# Patient Record
Sex: FEMALE | Race: BLACK OR AFRICAN AMERICAN | NOT HISPANIC OR LATINO | Employment: OTHER | URBAN - METROPOLITAN AREA
[De-identification: names, ages, dates, MRNs, and addresses within clinical notes are randomized per-mention and may not be internally consistent; named-entity substitution may affect disease eponyms.]

---

## 2017-06-20 PROBLEM — G51.39 HEMIFACIAL SPASM: Chronic | Status: ACTIVE | Noted: 2017-06-20

## 2022-07-21 DIAGNOSIS — I99.8 WHITE MATTER DISEASE OF BRAIN DUE TO ISCHEMIA: ICD-10-CM

## 2022-07-21 DIAGNOSIS — R25.9 ABNORMAL INVOLUNTARY MOVEMENT: ICD-10-CM

## 2022-07-21 DIAGNOSIS — R90.82 WHITE MATTER DISEASE OF BRAIN DUE TO ISCHEMIA: ICD-10-CM

## 2022-07-21 DIAGNOSIS — G51.31 HEMIFACIAL SPASM OF RIGHT SIDE OF FACE: Primary | ICD-10-CM

## 2022-07-25 ENCOUNTER — OFFICE VISIT (OUTPATIENT)
Dept: NEUROLOGY | Facility: CLINIC | Age: 62
End: 2022-07-25
Payer: MEDICARE

## 2022-07-25 VITALS
WEIGHT: 187 LBS | HEIGHT: 62 IN | SYSTOLIC BLOOD PRESSURE: 138 MMHG | BODY MASS INDEX: 34.41 KG/M2 | DIASTOLIC BLOOD PRESSURE: 80 MMHG

## 2022-07-25 DIAGNOSIS — R25.9 ABNORMAL INVOLUNTARY MOVEMENT: ICD-10-CM

## 2022-07-25 DIAGNOSIS — G51.31 HEMIFACIAL SPASM OF RIGHT SIDE OF FACE: Primary | ICD-10-CM

## 2022-07-25 DIAGNOSIS — R90.82 WHITE MATTER DISEASE OF BRAIN DUE TO ISCHEMIA: ICD-10-CM

## 2022-07-25 DIAGNOSIS — I99.8 WHITE MATTER DISEASE OF BRAIN DUE TO ISCHEMIA: ICD-10-CM

## 2022-07-25 PROCEDURE — 99214 OFFICE O/P EST MOD 30 MIN: CPT | Mod: PBBFAC | Performed by: SPECIALIST

## 2022-07-25 PROCEDURE — 99202 OFFICE O/P NEW SF 15 MIN: CPT | Mod: S$GLB,,, | Performed by: SPECIALIST

## 2022-07-25 PROCEDURE — 99999 PR PBB SHADOW E&M-EST. PATIENT-LVL IV: ICD-10-PCS | Mod: PBBFAC,,, | Performed by: SPECIALIST

## 2022-07-25 PROCEDURE — 99999 PR PBB SHADOW E&M-EST. PATIENT-LVL IV: CPT | Mod: PBBFAC,,, | Performed by: SPECIALIST

## 2022-07-25 PROCEDURE — 99202 PR OFFICE/OUTPT VISIT, NEW, LEVL II, 15-29 MIN: ICD-10-PCS | Mod: S$GLB,,, | Performed by: SPECIALIST

## 2022-07-25 RX ORDER — GLIMEPIRIDE 4 MG/1
TABLET ORAL
COMMUNITY
Start: 2022-05-23

## 2022-07-25 NOTE — PROGRESS NOTES
"Subjective:       Patient ID: Spring Yeung is a 61 y.o. female.    Chief Complaint: hemifacial spasm     HPI:            (Pt ref by Dr. Lora for neuro cons to eval for hemifacial spasm; states initial onset approx 2 yrs ago;   B facial spasms, but the right side seems to be most active; has been receiving botox from [facility] in [place], but no longer able to travel that distance/../Denies prior diff tolerating botox, and reports that it has been helpful: her last botox injection was sometime in the fall of 2021)    notes may also be on facesheet for HPI, ROS, and other sections     Review of Systems          Social History     Socioeconomic History    Marital status:    Tobacco Use    Smoking status: Never Smoker    Smokeless tobacco: Never Used   Substance and Sexual Activity    Alcohol use: Yes     Comment: occassional    Drug use: Never     ----------------------------  Diabetes mellitus  Hypertension  Other and unspecified hyperlipidemia      Current Outpatient Medications:     amlodipine (NORVASC) 10 MG tablet, Take 10 mg by mouth once daily., Disp: , Rfl:     aspirin (ECOTRIN) 81 MG EC tablet, Take 81 mg by mouth every other day., Disp: , Rfl:     glimepiride (AMARYL) 4 MG tablet, TAKE ONE TABLET BY MOUTH TWICE DAILY BEFORE meal, Disp: , Rfl:     losartan-hydrochlorothiazide 100-12.5 mg (HYZAAR) 100-12.5 mg Tab, Take 1 tablet by mouth once daily., Disp: , Rfl:     metformin (GLUCOPHAGE) 500 MG tablet, Take 500 mg by mouth 2 (two) times daily with meals., Disp: , Rfl:     mv,calcium,min/iron/folic/vitK (ONE-A-DAY WOMEN'S COMPLETE ORAL), Take by mouth., Disp: , Rfl:     pravastatin (PRAVACHOL) 20 MG tablet, Take 20 mg by mouth once daily., Disp: , Rfl:      Objective:        Exam:   /80   Ht 5' 2" (1.575 m)   Wt 84.8 kg (187 lb)   BMI 34.20 kg/m²     General Exam  _._unaccompanied  if accompanied, by__  body habitus_ Body mass index is 34.2 kg/m².    mental status_alert " and appropriate  oropharynx_Mallampati grade_  neck_  heart__  extremities_  skin_    Neurological:  cortical function__  MMSE:   No flowsheet data found.    Speech__ ok   cranial nerves:  CN 2 VF_ok   fundi_   CN 3, 4, 6 EOMs_ok  CN 3, pupils_ok  Abnl blink on R   CN 7_ lower face is asymmetric / intermittent R hf spasms   CN 8_hearing _  CN 12 tongue_ok    Motor__ seems ok   tone:   muscle stretch reflexes__  Vib sens_  Pin sens_  plantars__  tremor: _  coordination: _  gait_  indep    Neuroimaging:  She's had an MRI brain in the past   No reports     Labs:        Assessment/Plan:       Problem List Items Addressed This Visit        Neuro    Hemifacial spasm - right - Primary (Chronic)      Other Visit Diagnoses     Abnormal involuntary movement        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

## 2022-08-30 ENCOUNTER — TELEPHONE (OUTPATIENT)
Dept: NEUROLOGY | Facility: CLINIC | Age: 62
End: 2022-08-30
Payer: MEDICARE

## 2022-08-30 NOTE — TELEPHONE ENCOUNTER
I had contacted Dr. Lora's office to get prior injection notes, so that Medicare auth could be initiated  ..  Received a call back from Kimmie Lora's office: she states to disregard botox, that pt did not want to proceed  ..  Called to discuss w patient: she states that she has been notified that Dr. Lora can do her botox now.  Appt cancelled. She sends her appreciation, will call if needs  in future

## 2024-04-16 ENCOUNTER — HOSPITAL ENCOUNTER (EMERGENCY)
Facility: HOSPITAL | Age: 64
Discharge: HOME | End: 2024-04-16
Attending: EMERGENCY MEDICINE
Payer: MEDICARE

## 2024-04-16 VITALS
TEMPERATURE: 98 F | HEART RATE: 74 BPM | OXYGEN SATURATION: 100 % | WEIGHT: 206 LBS | SYSTOLIC BLOOD PRESSURE: 169 MMHG | DIASTOLIC BLOOD PRESSURE: 92 MMHG | BODY MASS INDEX: 33.11 KG/M2 | HEIGHT: 66 IN | RESPIRATION RATE: 16 BRPM

## 2024-04-16 DIAGNOSIS — S39.012A STRAIN OF LUMBAR PARASPINAL MUSCLE, INITIAL ENCOUNTER: Primary | ICD-10-CM

## 2024-04-16 PROCEDURE — 25000003 PHARM REV CODE 250: Performed by: PHYSICIAN ASSISTANT

## 2024-04-16 PROCEDURE — 96372 THER/PROPH/DIAG INJ SC/IM: CPT | Performed by: PHYSICIAN ASSISTANT

## 2024-04-16 PROCEDURE — 63600175 PHARM REV CODE 636 W HCPCS: Performed by: PHYSICIAN ASSISTANT

## 2024-04-16 PROCEDURE — 99284 EMERGENCY DEPT VISIT MOD MDM: CPT | Mod: 25

## 2024-04-16 RX ORDER — HYDROCODONE BITARTRATE AND ACETAMINOPHEN 5; 325 MG/1; MG/1
1 TABLET ORAL
Status: COMPLETED | OUTPATIENT
Start: 2024-04-16 | End: 2024-04-16

## 2024-04-16 RX ORDER — METHOCARBAMOL 500 MG/1
1000 TABLET, FILM COATED ORAL 3 TIMES DAILY
Qty: 30 TABLET | Refills: 0 | Status: SHIPPED | OUTPATIENT
Start: 2024-04-16 | End: 2024-04-21

## 2024-04-16 RX ORDER — HYDROCODONE BITARTRATE AND ACETAMINOPHEN 5; 325 MG/1; MG/1
1 TABLET ORAL EVERY 8 HOURS PRN
Qty: 9 TABLET | Refills: 0 | Status: SHIPPED | OUTPATIENT
Start: 2024-04-16 | End: 2024-04-19

## 2024-04-16 RX ORDER — ORPHENADRINE CITRATE 30 MG/ML
60 INJECTION INTRAMUSCULAR; INTRAVENOUS
Status: COMPLETED | OUTPATIENT
Start: 2024-04-16 | End: 2024-04-16

## 2024-04-16 RX ADMIN — HYDROCODONE BITARTRATE AND ACETAMINOPHEN 1 TABLET: 5; 325 TABLET ORAL at 04:04

## 2024-04-16 RX ADMIN — ORPHENADRINE CITRATE 60 MG: 60 INJECTION INTRAMUSCULAR; INTRAVENOUS at 05:04

## 2024-04-16 NOTE — ED PROVIDER NOTES
Encounter Date: 4/16/2024       History     Chief Complaint   Patient presents with    Back Pain     63-year-old female with a history of diabetes and hypertension presents to the ED with right lower back pain for the last month.  Patient states his symptoms started after lifting on 1 of her heavier patient's and states she was not lifted since.  Denies taking any medications at home.  Denies bowel or bladder incontinence, saddle paresthesia, numbness, tingling, weakness.    The history is provided by the patient. No  was used.     Review of patient's allergies indicates:   Allergen Reactions    Citrus and derivatives Anaphylaxis    Ibuprofen Anaphylaxis    Nsaids (non-steroidal anti-inflammatory drug)      Past Medical History:   Diagnosis Date    Diabetes mellitus     Hypertension     Other and unspecified hyperlipidemia      No past surgical history on file.  Family History   Family history unknown: Yes     Social History     Tobacco Use    Smoking status: Never    Smokeless tobacco: Never   Substance Use Topics    Alcohol use: Yes     Comment: occassional    Drug use: Never     Review of Systems   Constitutional:  Negative for chills and fever.   Eyes:  Negative for visual disturbance.   Respiratory:  Negative for cough and shortness of breath.    Cardiovascular:  Negative for chest pain.   Gastrointestinal:  Negative for abdominal pain, nausea and vomiting.   Genitourinary:  Negative for dysuria.   Musculoskeletal:  Positive for back pain. Negative for arthralgias.   Skin:  Negative for color change and rash.   Neurological:  Negative for dizziness and headaches.   Psychiatric/Behavioral:  Negative for behavioral problems.    All other systems reviewed and are negative.      Physical Exam     Initial Vitals [04/16/24 1559]   BP Pulse Resp Temp SpO2   (!) 169/92 74 18 98.2 °F (36.8 °C) 100 %      MAP       --         Physical Exam    Nursing note and vitals reviewed.  Constitutional: She  appears well-developed and well-nourished.   HENT:   Head: Normocephalic and atraumatic.   Eyes: EOM are normal. Pupils are equal, round, and reactive to light.   Neck: Neck supple.   Cardiovascular:  Normal rate, regular rhythm and normal heart sounds.           Pulmonary/Chest: Breath sounds normal.   Musculoskeletal:         General: Normal range of motion.      Cervical back: Normal and neck supple.      Thoracic back: Normal.      Lumbar back: Tenderness present. No spasms or bony tenderness. Normal range of motion. Negative right straight leg raise test and negative left straight leg raise test. No scoliosis.        Back:      Neurological: She is alert and oriented to person, place, and time. She has normal strength. GCS score is 15. GCS eye subscore is 4. GCS verbal subscore is 5. GCS motor subscore is 6.   Skin: Skin is warm and dry.   Psychiatric: She has a normal mood and affect.         ED Course   Procedures  Labs Reviewed - No data to display       Imaging Results              X-Ray Lumbar Spine Ap And Lateral (Final result)  Result time 04/16/24 16:31:53      Final result by Marialuisa Alatorre MD (04/16/24 16:31:53)                   Impression:      1. No acute abnormality identified.  2. Multilevel degenerative changes of the lumbar spine.      Electronically signed by: Marialuisa Alatorre  Date:    04/16/2024  Time:    16:31               Narrative:    EXAMINATION:  XR LUMBAR SPINE AP AND LATERAL    CLINICAL HISTORY:  low back pain;    COMPARISON:  None.    FINDINGS:  There are 5 non-rib-bearing lumbar type vertebral bodies.  Alignment is preserved.  The vertebral body heights are maintained.  There is moderate disc height loss at L5-S1 with small marginal osteophytes.  There is mild-to-moderate facet arthropathy in the lower lumbar spine.  There is scattered vascular calcifications.                                       Medications   HYDROcodone-acetaminophen 5-325 mg per tablet 1 tablet (1 tablet  Oral Given 4/16/24 1659)   orphenadrine injection 60 mg (60 mg Intramuscular Given 4/16/24 1700)     Medical Decision Making  Differential diagnosis:  Lumbar strain versus spasm, DJD, DDD    63-year-old female with a history of diabetes and hypertension presents to the ED with right lower back pain for the last month.  Patient states his symptoms started after lifting on 1 of her heavier patient's and states she was not lifted since.  Denies taking any medications at home.  Denies bowel or bladder incontinence, saddle paresthesia, numbness, tingling, weakness.    Amount and/or Complexity of Data Reviewed  Radiology: ordered.    Risk  Prescription drug management.                                      Clinical Impression:  Final diagnoses:  [S39.012A] Strain of lumbar paraspinal muscle, initial encounter (Primary)          ED Disposition Condition    Discharge Stable          ED Prescriptions       Medication Sig Dispense Start Date End Date Auth. Provider    HYDROcodone-acetaminophen (NORCO) 5-325 mg per tablet Take 1 tablet by mouth every 8 (eight) hours as needed for Pain. 9 tablet 4/16/2024 4/19/2024 Dwayne Mackay PA-C    methocarbamoL (ROBAXIN) 500 MG Tab Take 2 tablets (1,000 mg total) by mouth 3 (three) times daily. for 5 days 30 tablet 4/16/2024 4/21/2024 Dwayne Mackay PA-C          Follow-up Information       Follow up With Specialties Details Why Contact Info    Priyanka Howard, [facility]   [address] [place] [id number]  [phone]      [facility] - Emergency Dept Emergency Medicine In 1 week If symptoms worsen [id number]  [facility] [id number] [phone]             Dwayne Mackay PA-C  04/16/24 2097

## 2024-04-24 DIAGNOSIS — M25.561 ACUTE PAIN OF RIGHT KNEE: Primary | ICD-10-CM

## 2024-04-24 DIAGNOSIS — M25.561 RIGHT KNEE PAIN: Primary | ICD-10-CM

## 2024-06-14 DIAGNOSIS — M25.561 RIGHT KNEE PAIN: Primary | ICD-10-CM

## 2024-07-01 ENCOUNTER — OFFICE VISIT (OUTPATIENT)
Dept: ORTHOPEDICS | Facility: CLINIC | Age: 64
End: 2024-07-01
Payer: MEDICARE

## 2024-07-01 ENCOUNTER — HOSPITAL ENCOUNTER (OUTPATIENT)
Dept: RADIOLOGY | Facility: CLINIC | Age: 64
Discharge: HOME | End: 2024-07-01
Attending: REHABILITATION UNIT
Payer: MEDICARE

## 2024-07-01 VITALS
BODY MASS INDEX: 31.92 KG/M2 | WEIGHT: 198.63 LBS | SYSTOLIC BLOOD PRESSURE: 159 MMHG | HEIGHT: 66 IN | DIASTOLIC BLOOD PRESSURE: 101 MMHG | HEART RATE: 99 BPM

## 2024-07-01 DIAGNOSIS — M25.561 RIGHT KNEE PAIN, UNSPECIFIED CHRONICITY: ICD-10-CM

## 2024-07-01 DIAGNOSIS — M25.561 RIGHT KNEE PAIN: ICD-10-CM

## 2024-07-01 DIAGNOSIS — M17.11 ARTHRITIS OF RIGHT KNEE: Primary | ICD-10-CM

## 2024-07-01 PROCEDURE — 73564 X-RAY EXAM KNEE 4 OR MORE: CPT | Mod: RT,,, | Performed by: REHABILITATION UNIT

## 2024-07-01 RX ORDER — BETAMETHASONE SODIUM PHOSPHATE AND BETAMETHASONE ACETATE 3; 3 MG/ML; MG/ML
12 INJECTION, SUSPENSION INTRA-ARTICULAR; INTRALESIONAL; INTRAMUSCULAR; SOFT TISSUE
Status: DISCONTINUED | OUTPATIENT
Start: 2024-07-01 | End: 2024-07-01 | Stop reason: HOSPADM

## 2024-07-01 RX ORDER — MELOXICAM 15 MG/1
15 TABLET ORAL DAILY
Qty: 30 TABLET | Refills: 2 | Status: SHIPPED | OUTPATIENT
Start: 2024-07-01

## 2024-07-01 RX ORDER — LIDOCAINE HYDROCHLORIDE 20 MG/ML
40 INJECTION, SOLUTION INFILTRATION; PERINEURAL
Status: DISCONTINUED | OUTPATIENT
Start: 2024-07-01 | End: 2024-07-01 | Stop reason: HOSPADM

## 2024-07-01 RX ADMIN — BETAMETHASONE SODIUM PHOSPHATE AND BETAMETHASONE ACETATE 12 MG: 3; 3 INJECTION, SUSPENSION INTRA-ARTICULAR; INTRALESIONAL; INTRAMUSCULAR; SOFT TISSUE at 02:07

## 2024-07-01 RX ADMIN — LIDOCAINE HYDROCHLORIDE 40 MG: 20 INJECTION, SOLUTION INFILTRATION; PERINEURAL at 02:07

## 2024-07-01 NOTE — PROGRESS NOTES
Subjective:      Patient ID: Spring Yeung is a 63 y.o. female.    Chief Complaint: Pain of the Right Knee (Ongoing pain for about 4 years and has been getting worse over time. Stated knee feels like it will give out at random times. Stated knee swells at times. Is taking advil which only helps some. )    HPI:   Spring Yeung is a 63 y.o. female who presents today for initial evaluation of her right knee    History of Present Illness  The patient presents for evaluation of right knee pain. She is accompanied by her sister.    The patient has been experiencing persistent right knee pain for several years, which has recently intensified, accompanied by swelling. She has not sought medical attention or sought treatment for these symptoms. She has attempted to manage the pain with Advil, which provides temporary relief, but the pain recurs. She has not utilized any braces, therapy, or injections. Despite the pain, she does not require the use of a cane for mobility. She has ceased jogging and resumed walking due to the increased swelling. She reports crepitus and occasional instability during ambulation.    Past Medical History:   Diagnosis Date    Diabetes mellitus     Hypertension     Other and unspecified hyperlipidemia      History reviewed. No pertinent surgical history.  Social History     Socioeconomic History    Marital status:    Tobacco Use    Smoking status: Never    Smokeless tobacco: Never   Substance and Sexual Activity    Alcohol use: Yes     Comment: occassional    Drug use: Never         Current Outpatient Medications:     amlodipine (NORVASC) 10 MG tablet, Take 10 mg by mouth once daily., Disp: , Rfl:     aspirin (ECOTRIN) 81 MG EC tablet, Take 81 mg by mouth every other day., Disp: , Rfl:     glimepiride (AMARYL) 4 MG tablet, TAKE ONE TABLET BY MOUTH TWICE DAILY BEFORE meal, Disp: , Rfl:     losartan-hydrochlorothiazide 100-12.5 mg (HYZAAR) 100-12.5 mg Tab, Take 1 tablet by mouth  "once daily., Disp: , Rfl:     metformin (GLUCOPHAGE) 500 MG tablet, Take 500 mg by mouth 2 (two) times daily with meals., Disp: , Rfl:     mv,calcium,min/iron/folic/vitK (ONE-A-DAY WOMEN'S COMPLETE ORAL), Take by mouth., Disp: , Rfl:     pravastatin (PRAVACHOL) 20 MG tablet, Take 20 mg by mouth once daily., Disp: , Rfl:   Review of patient's allergies indicates:   Allergen Reactions    Citrus and derivatives Anaphylaxis    Ibuprofen Anaphylaxis    Nsaids (non-steroidal anti-inflammatory drug)        BP (!) 159/101 Comment: stated has been rushing all morning  Pulse 99   Ht 5' 6" (1.676 m)   Wt 90.1 kg (198 lb 9.6 oz)   BMI 32.05 kg/m²     Comprehensive review of systems completed and negative except as per HPI.        Objective:   Head: Normocephalic, without obvious abnormality, atraumatic  Eyes: conjunctivae/corneas clear. EOM's intact  Ears: normal external appearance  Nose: Nares normal. Septum midline. Mucosa normal. No drainage  Throat: normal findings: lips normal without lesions  Lungs: unlabored breathing on room air  Chest wall: symmetric chest rise  Heart: regular rate and rhythm  Pulses: 2+ and symmetric  Skin: Skin color, texture, turgor normal. No rashes or lesions  Neurologic: Grossly normal    right KNEE:     Alignment: neutral  Appearance: skin is intact without lesion  Effusion:  Mild  Gait: antalgic  Straight Leg Raise: negative  Log Roll: negative  Hip ROM: full and painless  Ankle ROM: full and painless   Knee ROM:  0 - 125  Tenderness: TTP medial joint line  Patellar Mobility: normal  Patellar grind: +  PF Crepitus:+     Valgus Stress @ 0 deg: stable  Valgus Stress @ 30 deg: stable  Varus Stress @ 0 deg: stable  Varus Stress @ 30 deg: stable  Lachman: stable  Ant Drawer: negative   Post Drawer: negative  Posterior Sag Sign: negative  Neurological deficits: SILT dp/sp/t distributions  Motor: 5/5 EHL/FHL/TA/GS    Warm well perfused lower extremity with capillary refill less than 2 seconds and " sensation intact to light touch in the terminal nerve distributions. Calf soft and easily compressible without clinical sign of DVT. No palpable popliteal lymphadenopathy    Assessment:     Imaging:   Four view weight bearing radiographs of the right knee obtained today personally reviewed showing no acute fractures or dislocations.  Tricompartmental osteoarthritis most severe in the lateral compartment with near bone-on-bone contact, subchondral sclerosis, and osteophyte formation.      4    Date/Time: 7/1/2024 2:00 PM    Performed by: Sahil Adams MD  Authorized by: Sahil Adams MD    Consent Done?:  Yes (Verbal)  Indications:  Arthritis and pain  Site marked: the procedure site was marked    Timeout: prior to procedure the correct patient, procedure, and site was verified    Prep: patient was prepped and draped in usual sterile fashion    Local anesthesia used?: No      Details:  Needle Size:  22 G  Ultrasonic Guidance for needle placement?: No    Approach:  Anterolateral  Location:  Knee  Site:  R knee  Medications:  40 mg LIDOcaine HCL 20 mg/ml (2%) 20 mg/mL (2 %); 12 mg betamethasone acetate-betamethasone sodium phosphate 6 mg/mL  Patient tolerance:  Patient tolerated the procedure well with no immediate complications        1. Arthritis of right knee    2. Right knee pain          Plan:       Orders Placed This Encounter    X-Ray Knee Complete 4 Or More Views Right        Assessment & Plan  1. Right knee osteoarthritis  Imaging and exam findings discussed.  The patient presents with advanced arthritis in the right knee, accompanied by bone spurs and joint space narrowing.  We discussed her options.  She elected to proceed with a steroid injection.  A steroid injection was administered into the right knee which was provided as above and she tolerated well.  Post-injection care was discussed.  Additionally, a regimen of exercises was provided.  She will continue symptomatic management with rest, ice,  compression, elevation.  Prescription provided for Mobic.  Avoid aggravating activities.  She will follow up with me as needed.  Discussed possible repeat injection as frequently as every 3 months.  Discussed that she may benefit from arthroplasty in the future.  Questions were answered and she was in agreement.    This note was generated with the assistance of ambient listening technology. Verbal consent was obtained by the patient and accompanying visitor(s) for the recording of patient appointment to facilitate this note. I attest to having reviewed and edited the generated note for accuracy, though some syntax or spelling errors may persist. Please contact the author of this note for any clarification.